# Patient Record
Sex: MALE | Race: OTHER | URBAN - METROPOLITAN AREA
[De-identification: names, ages, dates, MRNs, and addresses within clinical notes are randomized per-mention and may not be internally consistent; named-entity substitution may affect disease eponyms.]

---

## 2017-06-06 ENCOUNTER — OFFICE VISIT (OUTPATIENT)
Dept: PULMONOLOGY | Facility: CLINIC | Age: 22
End: 2017-06-06

## 2017-06-06 VITALS
BODY MASS INDEX: 27.54 KG/M2 | SYSTOLIC BLOOD PRESSURE: 114 MMHG | WEIGHT: 192.38 LBS | HEART RATE: 59 BPM | RESPIRATION RATE: 18 BRPM | DIASTOLIC BLOOD PRESSURE: 73 MMHG | OXYGEN SATURATION: 98 % | HEIGHT: 70 IN

## 2017-06-06 DIAGNOSIS — G47.10 HYPERSOMNIA: Primary | ICD-10-CM

## 2017-06-06 PROCEDURE — 99244 OFF/OP CNSLTJ NEW/EST MOD 40: CPT | Performed by: INTERNAL MEDICINE

## 2017-06-06 PROCEDURE — 99212 OFFICE O/P EST SF 10 MIN: CPT | Performed by: INTERNAL MEDICINE

## 2017-06-06 NOTE — PATIENT INSTRUCTIONS
Contact our office after 1 week of sleep study performed if you do not her back from us regarding results.

## 2017-06-06 NOTE — H&P
Referring Physician  Lamin Casanova MD    Chief Complaint  Sleep apnea evaluation    History of Present Illness  Shunt is a 70-year-old male who presents with chief complaint of hypersomnia and fatigue.   He states that over the course last 2 years he admit polyps   Cardio: RRR, S1 S2  Respiratory: clear to auscultation bilaterally, no wheezing, rales, rhonchi, crackles, no asymmetric dullness to percussion  GI: abdomen soft, non tender  Extremities: no clubbing, cyanosis, edema  Neurologic: no gross motor or

## 2017-06-21 ENCOUNTER — TELEPHONE (OUTPATIENT)
Dept: PULMONOLOGY | Facility: CLINIC | Age: 22
End: 2017-06-21

## 2017-06-21 DIAGNOSIS — G47.10 HYPERSOMNIA: Primary | ICD-10-CM

## 2017-06-21 NOTE — TELEPHONE ENCOUNTER
Dr. Blayne Hay,  Pt is scheduled for sleep study on 7/25. Please advise if you would like to pursue [id number] review. Thank you.

## 2017-06-21 NOTE — TELEPHONE ENCOUNTER
Received  Letter from [facility] denying sleep study. Please call for peer to peer   [phone]. Patient has an appointment already.   Thank Kyrie Beverly

## 2017-06-23 NOTE — TELEPHONE ENCOUNTER
Dr. Jenn Valencia,  The # to call for qicu-ry-wgex review is on the initial msg (see below). Thank you.

## 2017-06-28 NOTE — TELEPHONE ENCOUNTER
Left msg @ number given below for call back for izpi-ch-clfu review per Dr. Elvia Hoang request. [phone] phone # was given.

## 2017-06-29 NOTE — TELEPHONE ENCOUNTER
I have actually gone ahead and just ordered home sleep apnea test for this patient. Can you let him know. I am hoping this is approved.

## 2017-06-29 NOTE — TELEPHONE ENCOUNTER
Notified pt of Dr. Solorzano Has orders. Explained [facility] will obtain prior authorization for sleep study & inform him, so he may proceed to schedule. Their phone # was given.  Instructed pt to contact Sleep Center to cancel sleep study that is already sc

## 2017-07-19 ENCOUNTER — OFFICE VISIT (OUTPATIENT)
Dept: SLEEP CENTER | Age: 22
End: 2017-07-19
Attending: INTERNAL MEDICINE
Payer: COMMERCIAL

## 2017-07-19 DIAGNOSIS — G47.10 HYPERSOMNIA: Primary | ICD-10-CM

## 2017-07-19 PROCEDURE — 95806 SLEEP STUDY UNATT&RESP EFFT: CPT

## 2017-08-01 ENCOUNTER — TELEPHONE (OUTPATIENT)
Dept: PULMONOLOGY | Facility: CLINIC | Age: 22
End: 2017-08-01

## 2019-10-22 ENCOUNTER — OFFICE VISIT (OUTPATIENT)
Dept: FAMILY MEDICINE CLINIC | Facility: CLINIC | Age: 24
End: 2019-10-22
Payer: COMMERCIAL

## 2019-10-22 VITALS
WEIGHT: 180 LBS | SYSTOLIC BLOOD PRESSURE: 132 MMHG | HEART RATE: 86 BPM | TEMPERATURE: 98 F | OXYGEN SATURATION: 94 % | DIASTOLIC BLOOD PRESSURE: 80 MMHG | BODY MASS INDEX: 26.97 KG/M2 | HEIGHT: 68.5 IN

## 2019-10-22 DIAGNOSIS — J06.9 UPPER RESPIRATORY TRACT INFECTION, UNSPECIFIED TYPE: Primary | ICD-10-CM

## 2019-10-22 PROCEDURE — 99213 OFFICE O/P EST LOW 20 MIN: CPT | Performed by: PHYSICIAN ASSISTANT

## 2019-10-22 RX ORDER — PREDNISONE 20 MG/1
TABLET ORAL
Qty: 12 TABLET | Refills: 0 | Status: SHIPPED | OUTPATIENT
Start: 2019-10-22

## 2019-10-22 NOTE — PROGRESS NOTES
HPI:    Patient ID: Stephanie Goddard is a 25year old male. Pt presents with cold symptoms for the past 4 days. Pt has had cough, sore throat. Has body aches. Has some shortness of breath as well. No fevers. Pt has tried otc remedies without relief.  Pt s Oropharynx is clear and moist. Pharynx erythema present. No oropharyngeal exudate. Eyes: Conjunctivae are normal. Right eye exhibits no discharge. Left eye exhibits no discharge. Neck: Normal range of motion. Neck supple.    Cardiovascular: Normal rate,

## 2019-12-28 ENCOUNTER — TELEPHONE (OUTPATIENT)
Dept: FAMILY MEDICINE CLINIC | Facility: CLINIC | Age: 24
End: 2019-12-28

## 2019-12-28 ENCOUNTER — OFFICE VISIT (OUTPATIENT)
Dept: FAMILY MEDICINE CLINIC | Facility: CLINIC | Age: 24
End: 2019-12-28
Payer: COMMERCIAL

## 2019-12-28 VITALS
TEMPERATURE: 100 F | DIASTOLIC BLOOD PRESSURE: 94 MMHG | WEIGHT: 190 LBS | SYSTOLIC BLOOD PRESSURE: 138 MMHG | BODY MASS INDEX: 27.2 KG/M2 | HEART RATE: 93 BPM | HEIGHT: 70 IN | RESPIRATION RATE: 16 BRPM | OXYGEN SATURATION: 96 %

## 2019-12-28 DIAGNOSIS — R21 SCALY PATCH RASH: ICD-10-CM

## 2019-12-28 DIAGNOSIS — H65.111 ACUTE MUCOID OTITIS MEDIA OF RIGHT EAR: Primary | ICD-10-CM

## 2019-12-28 PROCEDURE — 99202 OFFICE O/P NEW SF 15 MIN: CPT | Performed by: NURSE PRACTITIONER

## 2019-12-28 RX ORDER — AMOXICILLIN AND CLAVULANATE POTASSIUM 875; 125 MG/1; MG/1
1 TABLET, FILM COATED ORAL 2 TIMES DAILY
Qty: 14 TABLET | Refills: 0 | Status: SHIPPED | OUTPATIENT
Start: 2019-12-28 | End: 2020-01-04

## 2019-12-28 NOTE — PATIENT INSTRUCTIONS
Psoriasis  Psoriasis is an inflammatory condition that affects the skin and nails. You may have patches of thick, red skin (plaques) covered with silvery scales. These often appear on the elbows, knees, legs, lower back, and scalp.   The plaques itch and · Bathing daily can help remove scales and calm inflamed skin. Use lukewarm water and mild soaps that have added oils, fats, and moisturizers. Avoid deodorants, antiperspirants, and antibacterial soaps. These have a drying effect.  Many people find it helpf The success of your medical treatment depends on you. When your healthcare provider gives you a treatment plan, ask when you should expect to see results. Then, follow your plan.  If your treatment does not work in the expected time, let your healthcare pro · Stick with treatment that your healthcare provider has recommended for you, especially if it's controlling your psoriasis. · Avoid abrasive cleansers, harsh detergents, and household chemicals.   Now that you know more about psoriasis, the next step is u Side effects that usually do not require medical attention (report to your doctor or health care professional if they continue or are bothersome):  · skin irritation  What may interact with this medicine? Interactions are not expected.  Do not use any othe Take this medicine by mouth with a full glass of water. Follow the directions on the prescription label. Take at the start of a meal. Do not crush or chew. If the tablet has a score line, you may cut it in half at the score line for easier swallowing.  Take What should I tell my health care provider before I take this medicine?   They need to know if you have any of these conditions:  · bowel disease, like colitis  · kidney disease  · liver disease  · mononucleosis  · an unusual or allergic reaction to amoxici This medicine is for external use only. Do not take by mouth. Follow the directions on the prescription label. Wash your hands before and after use. Apply a thin film of medicine to the affected area.  Do not cover with a bandage or dressing unless your doc What should I tell my health care provider before I take this medicine?   They need to know if you have any of these conditions:  · diabetes  · infection, like tuberculosis, herpes, or fungal infection  · large areas of burned or damaged skin  · skin wastin You have an infection of the middle ear, the space behind the eardrum. This is also called acute otitis media (AOM). Sometimes it is caused by the common cold.  This is because congestion can block the internal passage (eustachian tube) that drains fluid fr

## 2019-12-28 NOTE — PROGRESS NOTES
CHIEF COMPLAINT:   Patient presents with:  Ear Pain      HPI:   Sandip Santoyo is a 25year old male who presents to clinic today with complaints of right ear pain. Has had for 1  days. Pain is described as sharp.   Patient reports history of ear infecti Binge frequency: Less than monthly      Comment: occ    Drug use: No       REVIEW OF SYSTEMS:   GENERAL: feels good   SKIN: see hpi  HEENT: See HPI  LUNGS: No cough, shortness of breath, or wheezing.   CARDIOVASCULAR: No chest pain, palpitations  GI: No Sig: Take 1 tablet by mouth 2 (two) times daily for 7 days. • triamcinolone acetonide 0.1 % External Cream 1 Tube 0     Sig: Apply topically 2 (two) times daily for 7 days. Will treat with augmentin for AOM.      With regards to rash, suspect psoras Psoriasis is not contagious. It can’t spread to someone else who touches it. But it can be inherited. It is an autoimmune skin disease. This means that the immune system has an abnormal reaction. It treats healthy skin like it is a foreign substance.  This · Some exposure to UV rays from the sun can improve psoriasis. But too much sun can trigger an outbreak. It also raises your risk for skin cancer. Limit sun exposure and use sunscreen on healthy skin (at least 30 SPF).   · If you are prescribed medicine, ta The success of your medical treatment depends on you. When your healthcare provider gives you a treatment plan, ask when you should expect to see results. Then, follow your plan.  If your treatment does not work in the expected time, let your healthcare pro · Stick with treatment that your healthcare provider has recommended for you, especially if it's controlling your psoriasis. · Avoid abrasive cleansers, harsh detergents, and household chemicals.   Now that you know more about psoriasis, the next step is u Side effects that usually do not require medical attention (report to your doctor or health care professional if they continue or are bothersome):  · skin irritation  What may interact with this medicine? Interactions are not expected.  Do not use any othe Take this medicine by mouth with a full glass of water. Follow the directions on the prescription label. Take at the start of a meal. Do not crush or chew. If the tablet has a score line, you may cut it in half at the score line for easier swallowing.  Take What should I tell my health care provider before I take this medicine?   They need to know if you have any of these conditions:  · bowel disease, like colitis  · kidney disease  · liver disease  · mononucleosis  · an unusual or allergic reaction to amoxici This medicine is for external use only. Do not take by mouth. Follow the directions on the prescription label. Wash your hands before and after use. Apply a thin film of medicine to the affected area.  Do not cover with a bandage or dressing unless your doc What should I tell my health care provider before I take this medicine?   They need to know if you have any of these conditions:  · diabetes  · infection, like tuberculosis, herpes, or fungal infection  · large areas of burned or damaged skin  · skin wastin You have an infection of the middle ear, the space behind the eardrum. This is also called acute otitis media (AOM). Sometimes it is caused by the common cold.  This is because congestion can block the internal passage (eustachian tube) that drains fluid fr

## 2019-12-28 NOTE — TELEPHONE ENCOUNTER
Action Requested: Summary for Provider     []  Critical Lab, Recommendations Needed  [] Need Additional Advice  []   FYI    []   Need Orders  [] Need Medications Sent to Pharmacy  []  Other     SUMMARY:  [facility] going to [facility] evaluation sore throat.

## 2021-01-15 ENCOUNTER — OFFICE VISIT (OUTPATIENT)
Dept: FAMILY MEDICINE CLINIC | Facility: CLINIC | Age: 26
End: 2021-01-15
Payer: COMMERCIAL

## 2021-01-15 VITALS
TEMPERATURE: 98 F | SYSTOLIC BLOOD PRESSURE: 117 MMHG | DIASTOLIC BLOOD PRESSURE: 69 MMHG | HEART RATE: 65 BPM | WEIGHT: 197 LBS | BODY MASS INDEX: 29.86 KG/M2 | HEIGHT: 68 IN

## 2021-01-15 DIAGNOSIS — L98.9 SKIN LESION OF BACK: ICD-10-CM

## 2021-01-15 DIAGNOSIS — M25.562 ACUTE PAIN OF LEFT KNEE: Primary | ICD-10-CM

## 2021-01-15 DIAGNOSIS — G47.30 SLEEP APNEA, UNSPECIFIED TYPE: ICD-10-CM

## 2021-01-15 DIAGNOSIS — L21.9 SEBORRHEIC DERMATITIS OF SCALP: ICD-10-CM

## 2021-01-15 PROCEDURE — 99214 OFFICE O/P EST MOD 30 MIN: CPT | Performed by: STUDENT IN AN ORGANIZED HEALTH CARE EDUCATION/TRAINING PROGRAM

## 2021-01-15 PROCEDURE — 3078F DIAST BP <80 MM HG: CPT | Performed by: STUDENT IN AN ORGANIZED HEALTH CARE EDUCATION/TRAINING PROGRAM

## 2021-01-15 PROCEDURE — 3008F BODY MASS INDEX DOCD: CPT | Performed by: STUDENT IN AN ORGANIZED HEALTH CARE EDUCATION/TRAINING PROGRAM

## 2021-01-15 PROCEDURE — 3074F SYST BP LT 130 MM HG: CPT | Performed by: STUDENT IN AN ORGANIZED HEALTH CARE EDUCATION/TRAINING PROGRAM

## 2021-01-15 RX ORDER — KETOCONAZOLE 20 MG/ML
15 SHAMPOO TOPICAL
Qty: 120 ML | Refills: 1 | Status: SHIPPED | OUTPATIENT
Start: 2021-01-18

## 2021-01-15 RX ORDER — CLOTRIMAZOLE AND BETAMETHASONE DIPROPIONATE 10; .64 MG/G; MG/G
1 CREAM TOPICAL 2 TIMES DAILY PRN
Qty: 60 G | Refills: 1 | Status: SHIPPED | OUTPATIENT
Start: 2021-01-15

## 2021-01-15 RX ORDER — IBUPROFEN 600 MG/1
600 TABLET ORAL EVERY 8 HOURS PRN
Qty: 60 TABLET | Refills: 0 | Status: SHIPPED | OUTPATIENT
Start: 2021-01-15

## 2021-01-15 NOTE — PROGRESS NOTES
HPI:    Patient ID: Tenzin Fuentes is a 22year old male. HPI  Pt presenting with Left knee pain. He works for [employer], reports prolonged kneeling on Left knee on 12/29, after which he reported anterior knee soreness.  He went skiing shortly thereafter, rep Weight: 197 lb (89.4 kg)   Height: 5' 8\" (1.727 m)       Body mass index is 29.95 kg/m². PHYSICAL EXAM:   Physical Exam  Vitals signs reviewed. Constitutional:       General: He is not in acute distress. Appearance: Normal appearance.    HENT: Behavior: Behavior normal. Behavior is cooperative. Cognition and Memory: Cognition normal.             ASSESSMENT/PLAN:   1.  Acute pain of left knee  History and exam suggestive of PFS  - provided with gentle stretching/strengthening exercises

## 2022-09-08 ENCOUNTER — HOSPITAL ENCOUNTER (OUTPATIENT)
Age: 27
Discharge: HOME | End: 2022-09-08
Payer: COMMERCIAL

## 2022-09-08 VITALS
WEIGHT: 230 LBS | SYSTOLIC BLOOD PRESSURE: 129 MMHG | HEART RATE: 71 BPM | BODY MASS INDEX: 32.93 KG/M2 | HEIGHT: 70 IN | DIASTOLIC BLOOD PRESSURE: 81 MMHG | TEMPERATURE: 97 F | RESPIRATION RATE: 16 BRPM | OXYGEN SATURATION: 97 %

## 2022-09-08 DIAGNOSIS — Z20.822 ENCOUNTER FOR LABORATORY TESTING FOR COVID-19 VIRUS: ICD-10-CM

## 2022-09-08 DIAGNOSIS — J06.9 VIRAL URI WITH COUGH: Primary | ICD-10-CM

## 2022-09-08 LAB — SARS-COV-2 RNA RESP QL NAA+PROBE: NOT DETECTED

## 2023-01-01 ENCOUNTER — APPOINTMENT (OUTPATIENT)
Dept: GENERAL RADIOLOGY | Age: 28
End: 2023-01-01
Attending: NURSE PRACTITIONER
Payer: COMMERCIAL

## 2023-01-01 ENCOUNTER — HOSPITAL ENCOUNTER (OUTPATIENT)
Age: 28
Discharge: HOME | End: 2023-01-01
Payer: COMMERCIAL

## 2023-01-01 VITALS
RESPIRATION RATE: 20 BRPM | SYSTOLIC BLOOD PRESSURE: 105 MMHG | TEMPERATURE: 97 F | DIASTOLIC BLOOD PRESSURE: 73 MMHG | HEART RATE: 90 BPM | OXYGEN SATURATION: 98 %

## 2023-01-01 DIAGNOSIS — S63.502A WRIST SPRAIN, LEFT, INITIAL ENCOUNTER: ICD-10-CM

## 2023-01-01 DIAGNOSIS — S69.92XA WRIST INJURIES, LEFT, INITIAL ENCOUNTER: Primary | ICD-10-CM

## 2023-01-01 PROCEDURE — 99213 OFFICE O/P EST LOW 20 MIN: CPT | Performed by: NURSE PRACTITIONER

## 2023-01-01 PROCEDURE — L3908 WHO COCK-UP NONMOLDE PRE OTS: HCPCS | Performed by: NURSE PRACTITIONER

## 2023-01-01 PROCEDURE — 73110 X-RAY EXAM OF WRIST: CPT | Performed by: NURSE PRACTITIONER

## 2023-01-01 NOTE — DISCHARGE INSTRUCTIONS
No fracture seen on the x-ray. Wear the wrist splint for support. Ice. Elevate. Continue ibuprofen 3 tablets every 6 hours as needed for the pain.   If no improvement you should follow-up with orthopedics

## 2023-01-01 NOTE — ED INITIAL ASSESSMENT (HPI)
Pt reports left wrist pain, rates pain 8/10 after \"playing around punching friend in abdomen\". Pt reports pain when squeezing hand. Ibuprofen taken at 0500.

## 2023-03-30 ENCOUNTER — OFFICE VISIT (OUTPATIENT)
Dept: FAMILY MEDICINE CLINIC | Facility: CLINIC | Age: 28
End: 2023-03-30

## 2023-03-30 VITALS
SYSTOLIC BLOOD PRESSURE: 116 MMHG | DIASTOLIC BLOOD PRESSURE: 74 MMHG | TEMPERATURE: 94 F | HEIGHT: 70 IN | HEART RATE: 75 BPM | OXYGEN SATURATION: 97 % | WEIGHT: 252.81 LBS | BODY MASS INDEX: 36.19 KG/M2

## 2023-03-30 DIAGNOSIS — R06.83 SNORING: ICD-10-CM

## 2023-03-30 DIAGNOSIS — R09.81 CHRONIC NASAL CONGESTION: ICD-10-CM

## 2023-03-30 DIAGNOSIS — L40.9 PSORIASIS: Primary | ICD-10-CM

## 2023-03-30 PROCEDURE — 99214 OFFICE O/P EST MOD 30 MIN: CPT | Performed by: STUDENT IN AN ORGANIZED HEALTH CARE EDUCATION/TRAINING PROGRAM

## 2023-03-30 PROCEDURE — 3074F SYST BP LT 130 MM HG: CPT | Performed by: STUDENT IN AN ORGANIZED HEALTH CARE EDUCATION/TRAINING PROGRAM

## 2023-03-30 PROCEDURE — 3078F DIAST BP <80 MM HG: CPT | Performed by: STUDENT IN AN ORGANIZED HEALTH CARE EDUCATION/TRAINING PROGRAM

## 2023-03-30 PROCEDURE — 3008F BODY MASS INDEX DOCD: CPT | Performed by: STUDENT IN AN ORGANIZED HEALTH CARE EDUCATION/TRAINING PROGRAM

## 2023-03-30 RX ORDER — FLUTICASONE PROPIONATE 50 MCG
1 SPRAY, SUSPENSION (ML) NASAL 2 TIMES DAILY
Qty: 1 EACH | Refills: 1 | Status: SHIPPED | OUTPATIENT
Start: 2023-03-30 | End: 2023-04-03

## 2023-03-30 RX ORDER — CLOBETASOL PROPIONATE 0.46 MG/ML
SOLUTION TOPICAL 2 TIMES DAILY PRN
Qty: 50 ML | Refills: 0 | Status: SHIPPED | OUTPATIENT
Start: 2023-03-30 | End: 2023-04-03

## 2023-03-30 RX ORDER — TRIAMCINOLONE ACETONIDE 5 MG/G
1 CREAM TOPICAL 2 TIMES DAILY
Qty: 454 G | Refills: 1 | Status: SHIPPED | OUTPATIENT
Start: 2023-03-30 | End: 2023-04-03

## 2023-03-30 RX ORDER — CLOBETASOL PROPIONATE 0.5 MG/G
1 CREAM TOPICAL 2 TIMES DAILY PRN
Qty: 60 G | Refills: 0 | Status: SHIPPED | OUTPATIENT
Start: 2023-03-30 | End: 2023-04-03

## 2023-04-03 ENCOUNTER — TELEPHONE (OUTPATIENT)
Dept: FAMILY MEDICINE CLINIC | Facility: CLINIC | Age: 28
End: 2023-04-03

## 2023-04-03 DIAGNOSIS — L40.9 PSORIASIS: ICD-10-CM

## 2023-04-03 DIAGNOSIS — L98.9 SKIN LESION OF BACK: ICD-10-CM

## 2023-04-03 DIAGNOSIS — R09.81 CHRONIC NASAL CONGESTION: ICD-10-CM

## 2023-04-03 RX ORDER — FLUTICASONE PROPIONATE 50 MCG
1 SPRAY, SUSPENSION (ML) NASAL DAILY
Qty: 1 EACH | Refills: 1 | Status: SHIPPED | OUTPATIENT
Start: 2023-04-03

## 2023-04-03 RX ORDER — CLOBETASOL PROPIONATE 0.5 MG/G
1 CREAM TOPICAL 2 TIMES DAILY PRN
Qty: 60 G | Refills: 0 | Status: SHIPPED | OUTPATIENT
Start: 2023-04-03

## 2023-04-03 RX ORDER — TRIAMCINOLONE ACETONIDE 5 MG/G
1 CREAM TOPICAL 2 TIMES DAILY
Qty: 454 G | Refills: 1 | Status: SHIPPED | OUTPATIENT
Start: 2023-04-03

## 2023-04-03 RX ORDER — CLOBETASOL PROPIONATE 0.46 MG/ML
SOLUTION TOPICAL 2 TIMES DAILY PRN
Qty: 50 ML | Refills: 0 | Status: SHIPPED | OUTPATIENT
Start: 2023-04-03

## 2023-04-03 NOTE — TELEPHONE ENCOUNTER
PEr request below all medications sent on 3/30 by Mounika Covarrubias have been resent to [facility] in [place]

## 2023-04-03 NOTE — TELEPHONE ENCOUNTER
Patient called that other pharmacy does not take insurance, so all of the scripts from 3/30 needs to go to [facility] in [place]    clobetasol 0.05 % External Solution  clobetasol 0.05 % External Solution  fluticasone propionate 50 MCG/ACT Nasal Suspension  triamcinolone 0.5 % External Cream

## 2023-04-05 RX ORDER — TRIAMCINOLONE ACETONIDE 1 MG/G
CREAM TOPICAL 2 TIMES DAILY PRN
Qty: 454 G | Refills: 1 | Status: SHIPPED | OUTPATIENT
Start: 2023-04-05

## 2023-04-05 NOTE — TELEPHONE ENCOUNTER
Pharmacy contacted for clarification, 0.5% is not available. Ok to substitute 0.1% cream?  Pended for signature.

## 2023-09-12 DIAGNOSIS — L40.9 PSORIASIS: ICD-10-CM

## 2023-09-13 RX ORDER — CLOBETASOL PROPIONATE 0.5 MG/G
1 CREAM TOPICAL 2 TIMES DAILY PRN
Qty: 60 G | Refills: 1 | Status: SHIPPED | OUTPATIENT
Start: 2023-09-13

## 2024-01-05 DIAGNOSIS — L40.9 PSORIASIS: ICD-10-CM

## 2024-01-07 RX ORDER — CLOBETASOL PROPIONATE 0.5 MG/G
1 CREAM TOPICAL 2 TIMES DAILY PRN
Qty: 60 G | Refills: 1 | Status: SHIPPED | OUTPATIENT
Start: 2024-01-07

## 2024-01-07 NOTE — TELEPHONE ENCOUNTER
No protocol for requested medication.  Please advise on refill request.      Requested Prescriptions     Pending Prescriptions Disp Refills    clobetasol 0.05 % External Cream 60 g 1     Sig: Apply 1 Application topically 2 (two) times daily as needed.      Recent Visits  Date Type Provider Dept   03/30/23 Office Visit Hoda Collazo MD [facility]   Showing recent visits within past 540 days with a meds authorizing provider and meeting all other requirements  Future Appointments  No visits were found meeting these conditions.  Showing future appointments within next 150 days with a meds authorizing provider and meeting all other requirements     Requested Prescriptions   Pending Prescriptions Disp Refills    clobetasol 0.05 % External Cream 60 g 1     Sig: Apply 1 Application topically 2 (two) times daily as needed.       There is no refill protocol information for this order            Recent Outpatient Visits              9 months ago Psoriasis    [facility] [place]    Office Visit    2 years ago [facility]    Office Visit    4 years ago Acute mucoid otitis media of right ear    [facility], [facility] Dell Iniguez Jr., APN    Office Visit    4 years ago Upper respiratory tract infection, unspecified type    [facility] [place] Ayala PA-C    Office Visit    6 years ago Hypersomnia    [facility]    Office Visit

## 2024-02-29 DIAGNOSIS — L40.9 PSORIASIS: ICD-10-CM

## 2024-03-01 NOTE — TELEPHONE ENCOUNTER
Please review; protocol failed/ has no protocol      Message sent for patient to make an appointment.       Please see patients MyChart Message   Maverick Aranda JrHumberto MCGILL [facility] [id number] hours ago (9:58 PM)       Refills have been requested for the following medications:         clobetasol 0.05 % External Solution [Hoda Collazo]      Patient Comment: Rashes occuring          triamcinolone 0.1 % External Cream [Hoda Collazo]      Patient Comment: Rashes occuring         clobetasol 0.05 % External Cream [Hoda Collazo]      Patient Comment: Rashes occuring     Requested Prescriptions   Pending Prescriptions Disp Refills    clobetasol 0.05 % External Solution 50 mL 0     Sig: Apply 5-10 mL topically 2 (two) times daily as needed.       There is no refill protocol information for this order       triamcinolone 0.1 % External Cream 454 g 1     Sig: Apply topically 2 (two) times daily as needed.       There is no refill protocol information for this order       clobetasol 0.05 % External Cream 60 g 1     Sig: Apply 1 Application topically 2 (two) times daily as needed.       There is no refill protocol information for this order        Recent Outpatient Visits              11 months ago Psoriasis    [facility] [place]    Office Visit    3 years ago Acute pain of left knee    [facility] Hoda Ramos MD    Office Visit    4 years ago Acute mucoid otitis media of right ear    [facility] Dell Iniguez Jr., APN    Office Visit    4 years ago Upper respiratory tract infection, unspecified type    [facility] Eliseo Ayala PA-C    Office Visit    6 years ago Hypersomnia    [facility]    Office Visit

## 2024-03-01 NOTE — TELEPHONE ENCOUNTER
Patient scheduled an appointment     Future Appointments   Date Time Provider Department Center   4/3/2024  2:30 PM Hoda Collazo MD [facility] [place]

## 2024-03-01 NOTE — TELEPHONE ENCOUNTER
Talked to patient and the earliest appointment with Dr Collazo is on 04.03.2024 but per patient he needs his rx med as soon as possible.

## 2024-03-02 RX ORDER — CLOBETASOL PROPIONATE 0.46 MG/ML
SOLUTION TOPICAL 2 TIMES DAILY PRN
Qty: 50 ML | Refills: 0 | Status: SHIPPED | OUTPATIENT
Start: 2024-03-02

## 2024-03-02 RX ORDER — TRIAMCINOLONE ACETONIDE 1 MG/G
CREAM TOPICAL 2 TIMES DAILY PRN
Qty: 454 G | Refills: 0 | Status: SHIPPED | OUTPATIENT
Start: 2024-03-02 | End: 2024-03-04

## 2024-03-02 RX ORDER — CLOBETASOL PROPIONATE 0.5 MG/G
1 CREAM TOPICAL 2 TIMES DAILY PRN
Qty: 60 G | Refills: 0 | Status: SHIPPED | OUTPATIENT
Start: 2024-03-02

## 2024-03-04 RX ORDER — TRIAMCINOLONE ACETONIDE 5 MG/G
1 CREAM TOPICAL 2 TIMES DAILY PRN
Qty: 454 G | Refills: 1 | Status: SHIPPED | OUTPATIENT
Start: 2024-03-04

## 2024-03-04 NOTE — TELEPHONE ENCOUNTER
Pharmacy: drug not covered by plan, please send alternative      triamcinolone 0.1 % External Cream, Apply topically 2 (two) times daily as needed., Disp: 454 g, Rfl: 0

## 2024-04-20 DIAGNOSIS — L40.9 PSORIASIS: ICD-10-CM

## 2024-04-22 RX ORDER — CLOBETASOL PROPIONATE 0.5 MG/G
1 CREAM TOPICAL 2 TIMES DAILY PRN
Qty: 60 G | Refills: 0 | Status: SHIPPED | OUTPATIENT
Start: 2024-04-22

## 2024-04-22 RX ORDER — TRIAMCINOLONE ACETONIDE 5 MG/G
1 CREAM TOPICAL 2 TIMES DAILY PRN
Qty: 454 G | Refills: 0 | Status: SHIPPED | OUTPATIENT
Start: 2024-04-22

## 2024-04-22 NOTE — TELEPHONE ENCOUNTER
Please review; protocol failed/No Protocol    LOV: 03/30/2023  [employer] message sent to patient to schedule an appointment.    Requested Prescriptions   Pending Prescriptions Disp Refills    clobetasol 0.05 % External Cream 60 g 0     Sig: Apply 1 Application topically 2 (two) times daily as needed.       There is no refill protocol information for this order       triamcinolone 0.5 % External Cream 454 g 1     Sig: Apply 1 Application topically 2 (two) times daily as needed (itching).       There is no refill protocol information for this order          Recent Outpatient Visits              1 year ago Psoriasis    [facility] [place]    Office Visit    3 years ago [facility] pain of left knee    [facility]    Office Visit    4 years ago Acute mucoid otitis media of right ear    [facility] Dell Iniguez Jr., APN    Office Visit    4 years ago Upper respiratory tract infection, unspecified type    [facility] Eliseo Ayala PA-C    Office Visit    6 years ago Hypersomnia    [facility]    Office Visit

## 2024-08-06 DIAGNOSIS — L40.9 PSORIASIS: ICD-10-CM

## 2024-08-09 NOTE — TELEPHONE ENCOUNTER
Please review. Protocol Failed; No Protocol    Please see patients MyChart message:    clobetasol 0.05 % External Solution [Hoda Collazo]      Patient Comment: Harsher breakout and looking tonrefill and schedule appointment with dermatologist if possible.         clobetasol 0.05 % External Cream [Hoda Collazo]      Patient Comment: Harsher breakout and looking tonrefill and schedule appointment with dermatologist if possible.         triamcinolone 0.5 % External Cream [Hoda Collazo]      Patient Comment: Harsher breakout and looking tonrefill and schedule appointment with dermatologist if possible.       Requested Prescriptions   Pending Prescriptions Disp Refills    clobetasol 0.05 % External Solution 50 mL 0     Sig: Apply 5-10 mL topically 2 (two) times daily as needed.       There is no refill protocol information for this order       clobetasol 0.05 % External Cream 60 g 0     Sig: Apply 1 Application topically 2 (two) times daily as needed.       There is no refill protocol information for this order       triamcinolone 0.5 % External Cream 454 g 0     Sig: Apply 1 Application topically 2 (two) times daily as needed (itching).       There is no refill protocol information for this order              Recent Outpatient Visits              1 year ago Psoriasis    [facility] [place]    Office Visit    3 years ago [facility] pain of left [facility] [place]    Office Visit    4 years ago Acute mucoid otitis media of right ear    [facility], [facility] Dell Iniguez Jr., APN    Office Visit    4 years ago Upper respiratory tract infection, unspecified type    [facility] Eliseo Ayala PA-C    Office Visit    7 years ago Hypersomnia    [facility]    Office  Visit

## 2024-08-10 RX ORDER — CLOBETASOL PROPIONATE 0.5 MG/ML
SOLUTION TOPICAL 2 TIMES DAILY PRN
Qty: 50 ML | Refills: 0 | Status: SHIPPED | OUTPATIENT
Start: 2024-08-10

## 2024-08-10 RX ORDER — CLOBETASOL PROPIONATE 0.5 MG/G
1 CREAM TOPICAL 2 TIMES DAILY PRN
Qty: 60 G | Refills: 0 | Status: SHIPPED | OUTPATIENT
Start: 2024-08-10

## 2024-08-10 RX ORDER — TRIAMCINOLONE ACETONIDE 5 MG/G
1 CREAM TOPICAL 2 TIMES DAILY PRN
Qty: 454 G | Refills: 0 | Status: SHIPPED | OUTPATIENT
Start: 2024-08-10

## 2024-08-12 ENCOUNTER — TELEPHONE (OUTPATIENT)
Dept: FAMILY MEDICINE CLINIC | Facility: CLINIC | Age: 29
End: 2024-08-12

## 2024-08-12 DIAGNOSIS — L40.9 PSORIASIS: Primary | ICD-10-CM

## 2024-08-14 RX ORDER — TRIAMCINOLONE ACETONIDE 1 MG/G
CREAM TOPICAL 2 TIMES DAILY PRN
Qty: 80 G | Refills: 5 | Status: SHIPPED | OUTPATIENT
Start: 2024-08-14

## 2024-11-26 ENCOUNTER — APPOINTMENT (OUTPATIENT)
Dept: URBAN - METROPOLITAN AREA CLINIC 314 | Age: 29
Setting detail: DERMATOLOGY
End: 2024-12-02

## 2024-11-26 DIAGNOSIS — L40.0 PSORIASIS VULGARIS: ICD-10-CM

## 2024-11-26 PROCEDURE — OTHER PRESCRIPTION MEDICATION MANAGEMENT: OTHER

## 2024-11-26 PROCEDURE — OTHER ORDER TESTS: OTHER

## 2024-11-26 PROCEDURE — OTHER COUNSELING: OTHER

## 2024-11-26 PROCEDURE — OTHER PRESCRIPTION: OTHER

## 2024-11-26 PROCEDURE — 99204 OFFICE O/P NEW MOD 45 MIN: CPT

## 2024-11-26 RX ORDER — TACROLIMUS 1 MG/G
OINTMENT TOPICAL BID
Qty: 100 | Refills: 3 | Status: ERX | COMMUNITY
Start: 2024-11-26

## 2024-11-26 RX ORDER — TAPINAROF 10 MG/1000MG
CREAM TOPICAL QD
Qty: 60 | Refills: 6 | Status: ERX | COMMUNITY
Start: 2024-11-26

## 2024-11-26 ASSESSMENT — LOCATION DETAILED DESCRIPTION DERM
LOCATION DETAILED: LEFT ANTERIOR DISTAL THIGH
LOCATION DETAILED: LEFT DISTAL POSTERIOR UPPER ARM
LOCATION DETAILED: RIGHT ANTERIOR DISTAL THIGH
LOCATION DETAILED: LEFT ANTERIOR DISTAL UPPER ARM
LOCATION DETAILED: PERIUMBILICAL SKIN
LOCATION DETAILED: RIGHT DISTAL POSTERIOR THIGH
LOCATION DETAILED: LEFT DISTAL POSTERIOR THIGH
LOCATION DETAILED: LEFT PROXIMAL PRETIBIAL REGION
LOCATION DETAILED: RIGHT PROXIMAL PRETIBIAL REGION
LOCATION DETAILED: LEFT PROXIMAL CALF
LOCATION DETAILED: RIGHT DISTAL POSTERIOR UPPER ARM
LOCATION DETAILED: RIGHT MEDIAL UPPER BACK
LOCATION DETAILED: RIGHT PROXIMAL CALF
LOCATION DETAILED: RIGHT ANTERIOR DISTAL UPPER ARM

## 2024-11-26 ASSESSMENT — LOCATION SIMPLE DESCRIPTION DERM
LOCATION SIMPLE: ABDOMEN
LOCATION SIMPLE: RIGHT UPPER BACK
LOCATION SIMPLE: RIGHT POSTERIOR THIGH
LOCATION SIMPLE: LEFT POSTERIOR THIGH
LOCATION SIMPLE: RIGHT CALF
LOCATION SIMPLE: LEFT UPPER ARM
LOCATION SIMPLE: RIGHT PRETIBIAL REGION
LOCATION SIMPLE: RIGHT THIGH
LOCATION SIMPLE: LEFT CALF
LOCATION SIMPLE: LEFT PRETIBIAL REGION
LOCATION SIMPLE: RIGHT UPPER ARM
LOCATION SIMPLE: LEFT THIGH

## 2024-11-26 ASSESSMENT — LOCATION ZONE DERM
LOCATION ZONE: LEG
LOCATION ZONE: TRUNK
LOCATION ZONE: ARM

## 2024-11-26 ASSESSMENT — PGA PSORIASIS: PGA PSORIASIS 2020: MODERATE

## 2024-11-26 NOTE — HPI: RASH (PSORIASIS)
How Severe Is Your Psoriasis?: moderate
Is This A New Presentation, Or A Follow-Up?: Psoriasis
Additional History: Patient had seen PCP and was given Clobetasol cream. Patient has tried Clobetasol solution for scalp and Triamcinolone for the body.

## 2024-11-26 NOTE — PROCEDURE: PRESCRIPTION MEDICATION MANAGEMENT
Detail Level: Zone
Render In Strict Bullet Format?: No
Discontinue Regimen: Clobetasol cream (body)\\nTriamcinolone
Initiate Treatment: tacrolimus 0.1 % topical ointment BID\\nVtama 1 % topical cream QD
Continue Regimen: Clobetasol solution (scalp)

## 2024-11-26 NOTE — PROCEDURE: ORDER TESTS
Performing Laboratory: 0
Bill For Surgical Tray: no
Billing Type: Third-Party Bill
Expected Date Of Service: 11/26/2024

## 2025-05-14 ENCOUNTER — RX ONLY (RX ONLY)
Age: 30
End: 2025-05-14

## 2025-05-14 ENCOUNTER — APPOINTMENT (OUTPATIENT)
Dept: URBAN - METROPOLITAN AREA CLINIC 314 | Age: 30
Setting detail: DERMATOLOGY
End: 2025-05-30

## 2025-05-14 DIAGNOSIS — L40.0 PSORIASIS VULGARIS: ICD-10-CM

## 2025-05-14 PROCEDURE — OTHER PRESCRIPTION: OTHER

## 2025-05-14 PROCEDURE — 99214 OFFICE O/P EST MOD 30 MIN: CPT

## 2025-05-14 PROCEDURE — OTHER ORDER TESTS: OTHER

## 2025-05-14 PROCEDURE — OTHER PSORIATIC EPIDEMIOLOGY SCREENING TOOL (PEST): OTHER

## 2025-05-14 PROCEDURE — OTHER ADDITIONAL NOTES: OTHER

## 2025-05-14 PROCEDURE — OTHER MIPS QUALITY: OTHER

## 2025-05-14 PROCEDURE — OTHER COUNSELING: OTHER

## 2025-05-14 PROCEDURE — OTHER PRESCRIPTION MEDICATION MANAGEMENT: OTHER

## 2025-05-14 RX ORDER — ROFLUMILAST 3 MG/G
CREAM TOPICAL QD
Qty: 60 | Refills: 2 | Status: ERX | COMMUNITY
Start: 2025-05-14

## 2025-05-14 RX ORDER — TACROLIMUS 1 MG/G
OINTMENT TOPICAL BID
Qty: 100 | Refills: 3 | Status: ERX

## 2025-05-14 ASSESSMENT — LOCATION DETAILED DESCRIPTION DERM
LOCATION DETAILED: RIGHT MEDIAL UPPER BACK
LOCATION DETAILED: RIGHT PROXIMAL CALF
LOCATION DETAILED: PERIUMBILICAL SKIN
LOCATION DETAILED: LEFT ANTERIOR DISTAL THIGH
LOCATION DETAILED: LEFT ANTERIOR DISTAL UPPER ARM
LOCATION DETAILED: LEFT DISTAL POSTERIOR THIGH
LOCATION DETAILED: RIGHT DISTAL POSTERIOR UPPER ARM
LOCATION DETAILED: RIGHT PROXIMAL PRETIBIAL REGION
LOCATION DETAILED: RIGHT ANTERIOR DISTAL UPPER ARM
LOCATION DETAILED: RIGHT ANTERIOR DISTAL THIGH
LOCATION DETAILED: LEFT DISTAL POSTERIOR UPPER ARM
LOCATION DETAILED: RIGHT DISTAL POSTERIOR THIGH
LOCATION DETAILED: LEFT PROXIMAL CALF
LOCATION DETAILED: LEFT PROXIMAL PRETIBIAL REGION

## 2025-05-14 ASSESSMENT — LOCATION SIMPLE DESCRIPTION DERM
LOCATION SIMPLE: LEFT POSTERIOR THIGH
LOCATION SIMPLE: RIGHT UPPER ARM
LOCATION SIMPLE: RIGHT UPPER BACK
LOCATION SIMPLE: RIGHT THIGH
LOCATION SIMPLE: LEFT PRETIBIAL REGION
LOCATION SIMPLE: LEFT UPPER ARM
LOCATION SIMPLE: LEFT THIGH
LOCATION SIMPLE: RIGHT CALF
LOCATION SIMPLE: RIGHT PRETIBIAL REGION
LOCATION SIMPLE: RIGHT POSTERIOR THIGH
LOCATION SIMPLE: ABDOMEN
LOCATION SIMPLE: LEFT CALF

## 2025-05-14 ASSESSMENT — LOCATION ZONE DERM
LOCATION ZONE: ARM
LOCATION ZONE: TRUNK
LOCATION ZONE: LEG

## 2025-05-14 ASSESSMENT — ITCH NUMERIC RATING SCALE: HOW SEVERE IS YOUR ITCHING?: 7

## 2025-05-14 ASSESSMENT — BSA PSORIASIS: % BODY COVERED IN PSORIASIS: 60

## 2025-05-14 ASSESSMENT — PGA PSORIASIS: PGA PSORIASIS 2020: MODERATE

## 2025-08-06 ENCOUNTER — RX ONLY (RX ONLY)
Age: 30
End: 2025-08-06

## 2025-08-06 RX ORDER — GUSELKUMAB 100 MG/ML
INJECTION SUBCUTANEOUS
Qty: 1 | Refills: 5 | Status: ERX | COMMUNITY
Start: 2025-08-06

## 2025-08-06 RX ORDER — GUSELKUMAB 100 MG/ML
INJECTION SUBCUTANEOUS Q4WEEKS
Qty: 2 | Refills: 0 | Status: ERX

## (undated) NOTE — LETTER
Date & Time: 9/8/2022, 5:27 PM  Patient: Denzel Heard  Encounter Provider(s):    CHELO Herrera       To Whom It May Concern:    Denzel Heard was seen and treated in our department on 9/8/2022. He should not return to work until 09/12/2022.     If you have any questions or concerns, please do not hesitate to call.        _____________________________  Physician/APC Signature

## (undated) NOTE — MR AVS SNAPSHOT
[address]  [phone]               Thank you for choosing us for [facility] We are glad to serve you and happy to provide you with this summary of your visit. Is this a split study with titration if patient meets criteria?:  Yes    Indications/Symptoms:  Hypersomnia    Comorbidities: (Check all that apply):  None    Follow-Up Care:  Referring physician will continue to manage patient's care    Assoc Dx:  Hypers Choose whole grain products Foods high in sodium   Water is best for hydration Fast food.    Eat at home when possible     Tips for increasing your physical activity – Adults who are physically active are less likely to develop some chronic diseases than ad